# Patient Record
Sex: FEMALE | Race: WHITE | NOT HISPANIC OR LATINO | Employment: FULL TIME | URBAN - METROPOLITAN AREA
[De-identification: names, ages, dates, MRNs, and addresses within clinical notes are randomized per-mention and may not be internally consistent; named-entity substitution may affect disease eponyms.]

---

## 2017-02-22 ENCOUNTER — OFFICE VISIT (OUTPATIENT)
Dept: OPHTHALMOLOGY | Facility: CLINIC | Age: 22
End: 2017-02-22
Payer: COMMERCIAL

## 2017-02-22 DIAGNOSIS — H16.142: Primary | ICD-10-CM

## 2017-02-22 DIAGNOSIS — T15.92XA FOREIGN BODY, EYE, LEFT, INITIAL ENCOUNTER: ICD-10-CM

## 2017-02-22 PROCEDURE — 92002 INTRM OPH EXAM NEW PATIENT: CPT | Mod: 25,S$GLB,, | Performed by: OPTOMETRIST

## 2017-02-22 PROCEDURE — 65205 REMOVE FOREIGN BODY FROM EYE: CPT | Mod: LT,S$GLB,, | Performed by: OPTOMETRIST

## 2017-02-22 PROCEDURE — 99999 PR PBB SHADOW E&M-NEW PATIENT-LVL II: CPT | Mod: PBBFAC,,, | Performed by: OPTOMETRIST

## 2017-02-22 NOTE — PROGRESS NOTES
HPI     New Patient  Chief complaint: irritation, OS  Onset: last night  Patient has rinsed eye with cold water and has gotten temporary relief  Patient states that left eye is burning and running water  No matting in the AM  Light sensitive   No blurred vision  Patient is a contact lens wear and did not have lenses in at time of   occurrence   Foreign body sensation         Last edited by Aldo Walls MA on 2/22/2017  9:59 AM.         Assessment /Plan     For exam results, see Encounter Report.    Keratitis, superficial, punctate, left    Foreign body, eye, left, initial encounter      Mild keratitis vs. FB track superior cornea OS.  Everted upper left lid and found a fiber/small lash removed with sterile swab without issues.  She irrigated her OS copiously last night and her eye feels better this morning.  I gave her Tobrex solution to use 4 times a day for 3 days.  No CL wear for 3 days.  She has no SEI or A/C changes.  RTC prn.

## 2017-02-22 NOTE — MR AVS SNAPSHOT
[facility]hthalmology  [address] [facility] [address] [place] [address]  Phone: [phone]  Fax: [phone]                  Isa Clark   2017 9:45 AM   Office Visit    Description:  Female : 1995   Provider:  ROBERT Rizzo OD   Department:  [facility]           Reason for Visit     Eye Problem           Diagnoses this Visit        Comments    Keratitis, superficial, punctate, left    -  Primary     Foreign body, eye, left, initial encounter                To Do List           Goals (5 Years of Data)     None      Follow-Up and Disposition     Return if symptoms worsen or fail to improve.      [facility]     [facility] Nurse Care Line -  Assistance  Registered nurses in the [facility] provide clinical advisement, health education, appointment booking, and other advisory services.  Call for this free service at [phone].             Medications           Message regarding Medications     Verify the changes and/or additions to your medication regime listed below are the same as discussed with your clinician today.  If any of these changes or additions are incorrect, please notify your healthcare provider.             Verify that the below list of medications is an accurate representation of the medications you are currently taking.  If none reported, the list may be blank. If incorrect, please contact your healthcare provider. Carry this list with you in case of emergency.                Clinical Reference Information           Allergies as of 2017     No Known Allergies      Immunizations Administered on Date of Encounter - 2017     None      [facility] Sign-Up     Activating your [facility] account is as easy as 1-2-3!     1) Visit [email or web address], select Sign Up Now, enter this activation code and your date of birth, then select Next.  [email or web address]  Expires: 2017  8:29 AM      2) Create a username and password to use when you visit  [facility] in the future and select a security question in case you lose your password and select Next.    3) Enter your e-mail address and click Sign Up!    Additional Information  If you have questions, please e-mail [email or web address] or call [phone] to talk to our [facility] staff. Remember, [facility] is NOT to be used for urgent needs. For medical emergencies, dial 911.         Language Assistance Services     ATTENTION: Language assistance services are available, free of charge. Please call [phone].      ATENCIÓN: Si habla español, tiene a antonio disposición servicios gratuitos de asistencia lingüística. Llame al [phone].     CHÚ Ý: N?u b?n nói Ti?ng Vi?t, có các d?ch v? h? tr? ngôn ng? mi?n phí dành cho b?n. G?i s? [phone].         [facility] complies with applicable Federal civil rights laws and does not discriminate on the basis of race, color, national origin, age, disability, or sex.

## 2021-07-25 ENCOUNTER — CLINICAL SUPPORT (OUTPATIENT)
Dept: URGENT CARE | Facility: CLINIC | Age: 26
End: 2021-07-25
Payer: COMMERCIAL

## 2021-07-25 DIAGNOSIS — Z20.822 COVID-19 RULED OUT: Primary | ICD-10-CM

## 2021-07-25 LAB
CTP QC/QA: YES
SARS-COV-2 RDRP RESP QL NAA+PROBE: NEGATIVE

## 2021-07-25 PROCEDURE — U0002: ICD-10-PCS | Mod: QW,S$GLB,, | Performed by: PHYSICIAN ASSISTANT

## 2021-07-25 PROCEDURE — U0002 COVID-19 LAB TEST NON-CDC: HCPCS | Mod: QW,S$GLB,, | Performed by: PHYSICIAN ASSISTANT

## 2023-04-06 LAB
ABO AND RH: NORMAL
C TRACH RRNA SPEC QL PROBE: NEGATIVE
GONORRHEA: NEGATIVE
HBV SURFACE AG SERPL QL IA: NEGATIVE
HIV 1+2 AB+HIV1 P24 AG SERPL QL IA: NEGATIVE
RUBV IGG SER QL: NORMAL
T PALLIDUM AB SER QL: NONREACTIVE

## 2023-09-12 ENCOUNTER — HOSPITAL ENCOUNTER (EMERGENCY)
Facility: HOSPITAL | Age: 28
Discharge: HOME | End: 2023-09-12
Attending: SPECIALIST
Payer: COMMERCIAL

## 2023-09-12 VITALS
OXYGEN SATURATION: 99 % | RESPIRATION RATE: 18 BRPM | HEIGHT: 62 IN | WEIGHT: 146 LBS | TEMPERATURE: 98 F | DIASTOLIC BLOOD PRESSURE: 55 MMHG | SYSTOLIC BLOOD PRESSURE: 111 MMHG | HEART RATE: 87 BPM | BODY MASS INDEX: 26.87 KG/M2

## 2023-09-12 PROCEDURE — 96360 HYDRATION IV INFUSION INIT: CPT

## 2023-09-12 PROCEDURE — 63600175 PHARM REV CODE 636 W HCPCS: Performed by: SPECIALIST

## 2023-09-12 PROCEDURE — 99284 EMERGENCY DEPT VISIT MOD MDM: CPT | Mod: 25

## 2023-09-12 RX ORDER — ONDANSETRON 2 MG/ML
4 INJECTION INTRAMUSCULAR; INTRAVENOUS
Status: DISCONTINUED | OUTPATIENT
Start: 2023-09-12 | End: 2023-09-12 | Stop reason: HOSPADM

## 2023-09-12 RX ORDER — DEXTROSE, SODIUM CHLORIDE, SODIUM LACTATE, POTASSIUM CHLORIDE, AND CALCIUM CHLORIDE 5; .6; .31; .03; .02 G/100ML; G/100ML; G/100ML; G/100ML; G/100ML
INJECTION, SOLUTION INTRAVENOUS
Status: COMPLETED | OUTPATIENT
Start: 2023-09-12 | End: 2023-09-12

## 2023-09-12 RX ADMIN — SODIUM CHLORIDE, SODIUM LACTATE, POTASSIUM CHLORIDE, CALCIUM CHLORIDE AND DEXTROSE MONOHYDRATE: 5; 600; 310; 30; 20 INJECTION, SOLUTION INTRAVENOUS at 05:09
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
## 2023-09-12 NOTE — ED TRIAGE NOTES
Zofran offered per md order. Pt declines at this time stating it makes her sleepy. Patient sitting up talking and laughing with friend at bs; denies any pain

## 2023-09-21 LAB
PRENATAL STREP B CULTURE: NEGATIVE
STREP B PCR (OHS): NOT DETECTED

## 2023-10-12 ENCOUNTER — HOSPITAL ENCOUNTER (INPATIENT)
Facility: HOSPITAL | Age: 28
LOS: 2 days | Discharge: HOME | End: 2023-10-14
Attending: OBSTETRICS & GYNECOLOGY | Admitting: OBSTETRICS & GYNECOLOGY
Payer: COMMERCIAL

## 2023-10-12 ENCOUNTER — ANESTHESIA (OUTPATIENT)
Dept: OBSTETRICS AND GYNECOLOGY | Facility: HOSPITAL | Age: 28
End: 2023-10-12
Payer: COMMERCIAL

## 2023-10-12 ENCOUNTER — ANESTHESIA EVENT (OUTPATIENT)
Dept: OBSTETRICS AND GYNECOLOGY | Facility: HOSPITAL | Age: 28
End: 2023-10-12
Payer: COMMERCIAL

## 2023-10-12 VITALS — RESPIRATION RATE: 16 BRPM

## 2023-10-12 DIAGNOSIS — Z34.90 ENCOUNTER FOR INDUCTION OF LABOR: Primary | ICD-10-CM

## 2023-10-12 LAB
ABORH RETYPE: NORMAL
BASOPHILS # BLD AUTO: 0.02 X10(3)/MCL
BASOPHILS NFR BLD AUTO: 0.3 %
EOSINOPHIL # BLD AUTO: 0.04 X10(3)/MCL (ref 0–0.9)
EOSINOPHIL NFR BLD AUTO: 0.6 %
ERYTHROCYTE [DISTWIDTH] IN BLOOD BY AUTOMATED COUNT: 13.7 % (ref 11.5–17)
GROUP & RH: NORMAL
HCT VFR BLD AUTO: 36.9 % (ref 37–47)
HGB BLD-MCNC: 12.6 G/DL (ref 12–16)
IMM GRANULOCYTES # BLD AUTO: 0.03 X10(3)/MCL (ref 0–0.04)
IMM GRANULOCYTES NFR BLD AUTO: 0.4 %
INDIRECT COOMBS GEL: NORMAL
LYMPHOCYTES # BLD AUTO: 1.34 X10(3)/MCL (ref 0.6–4.6)
LYMPHOCYTES NFR BLD AUTO: 20 %
MCH RBC QN AUTO: 31 PG (ref 27–31)
MCHC RBC AUTO-ENTMCNC: 34.1 G/DL (ref 33–36)
MCV RBC AUTO: 90.7 FL (ref 80–94)
MONOCYTES # BLD AUTO: 0.67 X10(3)/MCL (ref 0.1–1.3)
MONOCYTES NFR BLD AUTO: 10 %
NEUTROPHILS # BLD AUTO: 4.6 X10(3)/MCL (ref 2.1–9.2)
NEUTROPHILS NFR BLD AUTO: 68.7 %
NRBC BLD AUTO-RTO: 0 %
PLATELET # BLD AUTO: 174 X10(3)/MCL (ref 130–400)
PMV BLD AUTO: 11.9 FL (ref 7.4–10.4)
RBC # BLD AUTO: 4.07 X10(6)/MCL (ref 4.2–5.4)
SPECIMEN OUTDATE: NORMAL
T PALLIDUM AB SER QL: NONREACTIVE
WBC # SPEC AUTO: 6.7 X10(3)/MCL (ref 4.5–11.5)

## 2023-10-12 PROCEDURE — 59409 OBSTETRICAL CARE: CPT | Mod: AA,P2,, | Performed by: ANESTHESIOLOGY

## 2023-10-12 PROCEDURE — 25000003 PHARM REV CODE 250: Performed by: OBSTETRICS & GYNECOLOGY

## 2023-10-12 PROCEDURE — 72200005 HC VAGINAL DELIVERY LEVEL II

## 2023-10-12 PROCEDURE — 86900 BLOOD TYPING SEROLOGIC ABO: CPT | Performed by: OBSTETRICS & GYNECOLOGY

## 2023-10-12 PROCEDURE — 72100003 HC LABOR CARE, EA. ADDL. 8 HRS

## 2023-10-12 PROCEDURE — 62326 NJX INTERLAMINAR LMBR/SAC: CPT | Performed by: ANESTHESIOLOGY

## 2023-10-12 PROCEDURE — 63600175 PHARM REV CODE 636 W HCPCS: Performed by: OBSTETRICS & GYNECOLOGY

## 2023-10-12 PROCEDURE — 11000001 HC ACUTE MED/SURG PRIVATE ROOM

## 2023-10-12 PROCEDURE — 59409 PRA ETRICAL CARE,VAG DELIV ONLY: ICD-10-PCS | Mod: AA,P2,, | Performed by: ANESTHESIOLOGY

## 2023-10-12 PROCEDURE — 25000003 PHARM REV CODE 250: Performed by: ANESTHESIOLOGY

## 2023-10-12 PROCEDURE — 63600175 PHARM REV CODE 636 W HCPCS: Performed by: ANESTHESIOLOGY

## 2023-10-12 PROCEDURE — 51702 INSERT TEMP BLADDER CATH: CPT

## 2023-10-12 PROCEDURE — 72100002 HC LABOR CARE, 1ST 8 HOURS

## 2023-10-12 PROCEDURE — 86780 TREPONEMA PALLIDUM: CPT | Performed by: OBSTETRICS & GYNECOLOGY

## 2023-10-12 PROCEDURE — 85025 COMPLETE CBC W/AUTO DIFF WBC: CPT | Performed by: OBSTETRICS & GYNECOLOGY

## 2023-10-12 RX ORDER — OXYTOCIN/RINGER'S LACTATE 30/500 ML
95 PLASTIC BAG, INJECTION (ML) INTRAVENOUS ONCE
Status: DISCONTINUED | OUTPATIENT
Start: 2023-10-12 | End: 2023-10-14 | Stop reason: HOSPADM

## 2023-10-12 RX ORDER — ONDANSETRON 2 MG/ML
4 INJECTION INTRAMUSCULAR; INTRAVENOUS EVERY 6 HOURS PRN
Status: DISCONTINUED | OUTPATIENT
Start: 2023-10-12 | End: 2023-10-12 | Stop reason: SDUPTHER

## 2023-10-12 RX ORDER — SIMETHICONE 80 MG
1 TABLET,CHEWABLE ORAL EVERY 6 HOURS PRN
Status: DISCONTINUED | OUTPATIENT
Start: 2023-10-12 | End: 2023-10-14 | Stop reason: HOSPADM

## 2023-10-12 RX ORDER — CALCIUM CARBONATE 200(500)MG
500 TABLET,CHEWABLE ORAL 3 TIMES DAILY PRN
Status: DISCONTINUED | OUTPATIENT
Start: 2023-10-12 | End: 2023-10-14 | Stop reason: HOSPADM

## 2023-10-12 RX ORDER — DIPHENHYDRAMINE HYDROCHLORIDE 50 MG/ML
25 INJECTION INTRAMUSCULAR; INTRAVENOUS EVERY 4 HOURS PRN
Status: DISCONTINUED | OUTPATIENT
Start: 2023-10-12 | End: 2023-10-14 | Stop reason: HOSPADM

## 2023-10-12 RX ORDER — OXYTOCIN/RINGER'S LACTATE 30/500 ML
334 PLASTIC BAG, INJECTION (ML) INTRAVENOUS ONCE AS NEEDED
Status: DISCONTINUED | OUTPATIENT
Start: 2023-10-12 | End: 2023-10-14 | Stop reason: HOSPADM

## 2023-10-12 RX ORDER — DOCUSATE SODIUM 100 MG/1
200 CAPSULE, LIQUID FILLED ORAL 2 TIMES DAILY PRN
Status: DISCONTINUED | OUTPATIENT
Start: 2023-10-12 | End: 2023-10-14 | Stop reason: HOSPADM

## 2023-10-12 RX ORDER — OXYTOCIN/RINGER'S LACTATE 30/500 ML
0-30 PLASTIC BAG, INJECTION (ML) INTRAVENOUS CONTINUOUS
Status: DISCONTINUED | OUTPATIENT
Start: 2023-10-12 | End: 2023-10-14 | Stop reason: HOSPADM

## 2023-10-12 RX ORDER — OXYTOCIN/RINGER'S LACTATE 30/500 ML
95 PLASTIC BAG, INJECTION (ML) INTRAVENOUS ONCE AS NEEDED
Status: DISCONTINUED | OUTPATIENT
Start: 2023-10-12 | End: 2023-10-14 | Stop reason: HOSPADM

## 2023-10-12 RX ORDER — MISOPROSTOL 100 UG/1
800 TABLET ORAL ONCE AS NEEDED
Status: DISCONTINUED | OUTPATIENT
Start: 2023-10-12 | End: 2023-10-14 | Stop reason: HOSPADM

## 2023-10-12 RX ORDER — LIDOCAINE HYDROCHLORIDE 10 MG/ML
1 INJECTION, SOLUTION EPIDURAL; INFILTRATION; INTRACAUDAL; PERINEURAL ONCE
Status: DISCONTINUED | OUTPATIENT
Start: 2023-10-12 | End: 2023-10-14 | Stop reason: HOSPADM

## 2023-10-12 RX ORDER — DEXTROSE, SODIUM CHLORIDE, SODIUM LACTATE, POTASSIUM CHLORIDE, AND CALCIUM CHLORIDE 5; .6; .31; .03; .02 G/100ML; G/100ML; G/100ML; G/100ML; G/100ML
INJECTION, SOLUTION INTRAVENOUS CONTINUOUS
Status: DISCONTINUED | OUTPATIENT
Start: 2023-10-12 | End: 2023-10-14 | Stop reason: HOSPADM

## 2023-10-12 RX ORDER — DIPHENOXYLATE HYDROCHLORIDE AND ATROPINE SULFATE 2.5; .025 MG/1; MG/1
2 TABLET ORAL EVERY 6 HOURS PRN
Status: DISCONTINUED | OUTPATIENT
Start: 2023-10-12 | End: 2023-10-14 | Stop reason: HOSPADM

## 2023-10-12 RX ORDER — NALOXONE HCL 0.4 MG/ML
0.4 VIAL (ML) INJECTION SEE ADMIN INSTRUCTIONS
Status: DISCONTINUED | OUTPATIENT
Start: 2023-10-12 | End: 2023-10-14 | Stop reason: HOSPADM

## 2023-10-12 RX ORDER — PROCHLORPERAZINE EDISYLATE 5 MG/ML
5 INJECTION INTRAMUSCULAR; INTRAVENOUS EVERY 6 HOURS PRN
Status: DISCONTINUED | OUTPATIENT
Start: 2023-10-12 | End: 2023-10-14 | Stop reason: HOSPADM

## 2023-10-12 RX ORDER — OXYTOCIN/RINGER'S LACTATE 30/500 ML
334 PLASTIC BAG, INJECTION (ML) INTRAVENOUS ONCE AS NEEDED
Status: DISCONTINUED | OUTPATIENT
Start: 2023-10-12 | End: 2023-10-12 | Stop reason: SDUPTHER

## 2023-10-12 RX ORDER — BUPIVACAINE HYDROCHLORIDE 2.5 MG/ML
INJECTION, SOLUTION EPIDURAL; INFILTRATION; INTRACAUDAL
Status: COMPLETED | OUTPATIENT
Start: 2023-10-12 | End: 2023-10-12

## 2023-10-12 RX ORDER — HYDROCORTISONE 25 MG/G
CREAM TOPICAL 3 TIMES DAILY PRN
Status: DISCONTINUED | OUTPATIENT
Start: 2023-10-12 | End: 2023-10-14 | Stop reason: HOSPADM

## 2023-10-12 RX ORDER — EPHEDRINE SULFATE 50 MG/ML
10 INJECTION, SOLUTION INTRAVENOUS ONCE AS NEEDED
Status: DISCONTINUED | OUTPATIENT
Start: 2023-10-12 | End: 2023-10-14 | Stop reason: HOSPADM

## 2023-10-12 RX ORDER — PROCHLORPERAZINE EDISYLATE 5 MG/ML
5 INJECTION INTRAMUSCULAR; INTRAVENOUS EVERY 6 HOURS PRN
Status: DISCONTINUED | OUTPATIENT
Start: 2023-10-12 | End: 2023-10-12 | Stop reason: SDUPTHER

## 2023-10-12 RX ORDER — ACETAMINOPHEN 325 MG/1
650 TABLET ORAL EVERY 6 HOURS PRN
Status: DISCONTINUED | OUTPATIENT
Start: 2023-10-12 | End: 2023-10-14 | Stop reason: HOSPADM

## 2023-10-12 RX ORDER — PRENATAL WITH FERROUS FUM AND FOLIC ACID 3080; 920; 120; 400; 22; 1.84; 3; 20; 10; 1; 12; 200; 27; 25; 2 [IU]/1; [IU]/1; MG/1; [IU]/1; MG/1; MG/1; MG/1; MG/1; MG/1; MG/1; UG/1; MG/1; MG/1; MG/1; MG/1
1 TABLET ORAL DAILY
Status: DISCONTINUED | OUTPATIENT
Start: 2023-10-13 | End: 2023-10-14 | Stop reason: HOSPADM

## 2023-10-12 RX ORDER — LIDOCAINE HYDROCHLORIDE 10 MG/ML
10 INJECTION INFILTRATION; PERINEURAL ONCE AS NEEDED
Status: DISCONTINUED | OUTPATIENT
Start: 2023-10-12 | End: 2023-10-14 | Stop reason: HOSPADM

## 2023-10-12 RX ORDER — DIPHENOXYLATE HYDROCHLORIDE AND ATROPINE SULFATE 2.5; .025 MG/1; MG/1
2 TABLET ORAL EVERY 6 HOURS PRN
Status: DISCONTINUED | OUTPATIENT
Start: 2023-10-12 | End: 2023-10-12 | Stop reason: SDUPTHER

## 2023-10-12 RX ORDER — ONDANSETRON 4 MG/1
8 TABLET, ORALLY DISINTEGRATING ORAL EVERY 8 HOURS PRN
Status: DISCONTINUED | OUTPATIENT
Start: 2023-10-12 | End: 2023-10-14 | Stop reason: HOSPADM

## 2023-10-12 RX ORDER — OXYCODONE AND ACETAMINOPHEN 5; 325 MG/1; MG/1
1 TABLET ORAL EVERY 4 HOURS PRN
Status: DISCONTINUED | OUTPATIENT
Start: 2023-10-12 | End: 2023-10-14 | Stop reason: HOSPADM

## 2023-10-12 RX ORDER — MISOPROSTOL 100 UG/1
800 TABLET ORAL ONCE AS NEEDED
Status: DISCONTINUED | OUTPATIENT
Start: 2023-10-12 | End: 2023-10-12 | Stop reason: SDUPTHER

## 2023-10-12 RX ORDER — IBUPROFEN 600 MG/1
600 TABLET ORAL EVERY 6 HOURS PRN
Status: DISCONTINUED | OUTPATIENT
Start: 2023-10-12 | End: 2023-10-14 | Stop reason: HOSPADM

## 2023-10-12 RX ORDER — CARBOPROST TROMETHAMINE 250 UG/ML
250 INJECTION, SOLUTION INTRAMUSCULAR
Status: DISCONTINUED | OUTPATIENT
Start: 2023-10-12 | End: 2023-10-14 | Stop reason: HOSPADM

## 2023-10-12 RX ORDER — FENTANYL/BUPIVACAINE/NS/PF 2-1250MCG
PLASTIC BAG, INJECTION (ML) INJECTION CONTINUOUS PRN
Status: DISCONTINUED | OUTPATIENT
Start: 2023-10-12 | End: 2023-10-12

## 2023-10-12 RX ORDER — MUPIROCIN 20 MG/G
OINTMENT TOPICAL
Status: DISCONTINUED | OUTPATIENT
Start: 2023-10-12 | End: 2023-10-14 | Stop reason: HOSPADM

## 2023-10-12 RX ORDER — OXYTOCIN 10 [USP'U]/ML
10 INJECTION, SOLUTION INTRAMUSCULAR; INTRAVENOUS ONCE AS NEEDED
Status: DISCONTINUED | OUTPATIENT
Start: 2023-10-12 | End: 2023-10-14 | Stop reason: HOSPADM

## 2023-10-12 RX ORDER — OXYTOCIN/RINGER'S LACTATE 30/500 ML
334 PLASTIC BAG, INJECTION (ML) INTRAVENOUS ONCE
Status: DISCONTINUED | OUTPATIENT
Start: 2023-10-12 | End: 2023-10-14 | Stop reason: HOSPADM

## 2023-10-12 RX ORDER — ACETAMINOPHEN 325 MG/1
650 TABLET ORAL EVERY 6 HOURS PRN
Status: DISCONTINUED | OUTPATIENT
Start: 2023-10-12 | End: 2023-10-12 | Stop reason: SDUPTHER

## 2023-10-12 RX ORDER — BUPIVACAINE HYDROCHLORIDE 2.5 MG/ML
INJECTION, SOLUTION INFILTRATION; PERINEURAL
Status: DISCONTINUED | OUTPATIENT
Start: 2023-10-12 | End: 2023-10-12

## 2023-10-12 RX ORDER — METHYLERGONOVINE MALEATE 0.2 MG/ML
200 INJECTION INTRAVENOUS ONCE AS NEEDED
Status: DISCONTINUED | OUTPATIENT
Start: 2023-10-12 | End: 2023-10-12 | Stop reason: SDUPTHER

## 2023-10-12 RX ORDER — OXYCODONE AND ACETAMINOPHEN 10; 325 MG/1; MG/1
1 TABLET ORAL EVERY 4 HOURS PRN
Status: DISCONTINUED | OUTPATIENT
Start: 2023-10-12 | End: 2023-10-14 | Stop reason: HOSPADM

## 2023-10-12 RX ORDER — CARBOPROST TROMETHAMINE 250 UG/ML
250 INJECTION, SOLUTION INTRAMUSCULAR
Status: DISCONTINUED | OUTPATIENT
Start: 2023-10-12 | End: 2023-10-12 | Stop reason: SDUPTHER

## 2023-10-12 RX ORDER — OXYTOCIN/RINGER'S LACTATE 30/500 ML
95 PLASTIC BAG, INJECTION (ML) INTRAVENOUS ONCE AS NEEDED
Status: DISCONTINUED | OUTPATIENT
Start: 2023-10-12 | End: 2023-10-12 | Stop reason: SDUPTHER

## 2023-10-12 RX ORDER — SIMETHICONE 80 MG
1 TABLET,CHEWABLE ORAL 4 TIMES DAILY PRN
Status: DISCONTINUED | OUTPATIENT
Start: 2023-10-12 | End: 2023-10-12 | Stop reason: SDUPTHER

## 2023-10-12 RX ORDER — DIPHENHYDRAMINE HCL 25 MG
25 CAPSULE ORAL EVERY 4 HOURS PRN
Status: DISCONTINUED | OUTPATIENT
Start: 2023-10-12 | End: 2023-10-14 | Stop reason: HOSPADM

## 2023-10-12 RX ORDER — OXYTOCIN 10 [USP'U]/ML
10 INJECTION, SOLUTION INTRAMUSCULAR; INTRAVENOUS ONCE AS NEEDED
Status: DISCONTINUED | OUTPATIENT
Start: 2023-10-12 | End: 2023-10-12 | Stop reason: SDUPTHER

## 2023-10-12 RX ORDER — SODIUM CITRATE AND CITRIC ACID MONOHYDRATE 334; 500 MG/5ML; MG/5ML
30 SOLUTION ORAL ONCE
Status: DISCONTINUED | OUTPATIENT
Start: 2023-10-12 | End: 2023-10-14 | Stop reason: HOSPADM

## 2023-10-12 RX ORDER — METHYLERGONOVINE MALEATE 0.2 MG/ML
200 INJECTION INTRAVENOUS ONCE AS NEEDED
Status: DISCONTINUED | OUTPATIENT
Start: 2023-10-12 | End: 2023-10-14 | Stop reason: HOSPADM

## 2023-10-12 RX ADMIN — BUPIVACAINE HYDROCHLORIDE 10 ML: 2.5 INJECTION, SOLUTION INFILTRATION; PERINEURAL at 02:10

## 2023-10-12 RX ADMIN — BUPIVACAINE HYDROCHLORIDE 7 ML: 2.5 INJECTION, SOLUTION EPIDURAL; INFILTRATION; INTRACAUDAL; PERINEURAL at 11:10

## 2023-10-12 RX ADMIN — SODIUM CHLORIDE, SODIUM LACTATE, POTASSIUM CHLORIDE, CALCIUM CHLORIDE AND DEXTROSE MONOHYDRATE: 5; 600; 310; 30; 20 INJECTION, SOLUTION INTRAVENOUS at 05:10

## 2023-10-12 RX ADMIN — SODIUM CHLORIDE, POTASSIUM CHLORIDE, SODIUM LACTATE AND CALCIUM CHLORIDE 1000 ML: 600; 310; 30; 20 INJECTION, SOLUTION INTRAVENOUS at 10:10

## 2023-10-12 RX ADMIN — PROCHLORPERAZINE EDISYLATE 5 MG: 5 INJECTION INTRAMUSCULAR; INTRAVENOUS at 03:10

## 2023-10-12 RX ADMIN — Medication 2 MILLI-UNITS/MIN: at 05:10

## 2023-10-12 RX ADMIN — SODIUM CHLORIDE, SODIUM LACTATE, POTASSIUM CHLORIDE, CALCIUM CHLORIDE AND DEXTROSE MONOHYDRATE: 5; 600; 310; 30; 20 INJECTION, SOLUTION INTRAVENOUS at 04:10

## 2023-10-12 RX ADMIN — IBUPROFEN 600 MG: 600 TABLET, FILM COATED ORAL at 11:10

## 2023-10-12 RX ADMIN — ONDANSETRON 4 MG: 2 INJECTION INTRAMUSCULAR; INTRAVENOUS at 11:10

## 2023-10-12 RX ADMIN — Medication 12 ML/HR: at 11:10

## 2023-10-12 NOTE — H&P
"   HISTORY AND PHYSICAL                                                OBSTETRICS          Subjective:      Isa Rice is a 28 y.o.  female with IUP at 39w0d weeks gestation who presents to L&D for IOL. Pertinent medical history for this pregnancy includes none.  Care this pregnancy has been with Dr. Brooke SEWELL    PMHx:   Past Medical History:   Diagnosis Date    Glossopharyngeal neuralgia        PSHx:   Past Surgical History:   Procedure Laterality Date    TONSILLECTOMY AND ADENOIDECTOMY      WISDOM TOOTH EXTRACTION         All: Review of patient's allergies indicates:  No Known Allergies    Meds:   Medications Prior to Admission   Medication Sig Dispense Refill Last Dose    prenatal vit/iron fum/folic ac (PRENATAL 1+1 ORAL) Take by mouth.   10/12/2023       SH:   Social History     Socioeconomic History    Marital status:    Tobacco Use    Smoking status: Never    Smokeless tobacco: Never   Substance and Sexual Activity    Alcohol use: Yes    Drug use: Never    Sexual activity: Yes     Partners: Male     Birth control/protection: None       FH:   Family History   Problem Relation Age of Onset    Glaucoma Maternal Grandmother     Diabetes Maternal Grandmother     Glaucoma Maternal Grandfather     Diabetes Maternal Grandfather        OBHx:   OB History    Para Term  AB Living   1 0 0 0 0 0   SAB IAB Ectopic Multiple Live Births   0 0 0 0 0      # Outcome Date GA Lbr Solo/2nd Weight Sex Delivery Anes PTL Lv   1 Current                Objective:      /68   Pulse 64   Temp 98.1 °F (36.7 °C)   Resp 17   Ht 5' 2" (1.575 m)   Wt 67.6 kg (149 lb)   SpO2 99%   Breastfeeding No   BMI 27.25 kg/m²   Body mass index is 27.25 kg/m².    General:   alert and cooperative   HEENT:  normocephalic, atraumatic   Lungs:   clear to auscultation bilaterally   Heart:   regular rate and rhythm, S1, S2 normal   Abdomen:  gravid, non-tender   Extremities non-tender, no edema   Derm: no rashes " or lesions   Psych: appropriate mood and affect   Pelvis:  adequate       FHT: Category: 1                 TOCO: Contractions: regular, every 3 minutes       Cervix:     Dilation: 2 cm    Effacement: 75%    Station:  -2    Consistency: moderate    Position anterior   S/p AROM with clear fluid    Lab Review  GBS: negative      Assessment:     28 y.o.  at 39w0d weeks gestation for IOL     Plan:     1. Risks, benefits, alternatives and possible complications have been discussed in detail with the patient. All questions have been answered, and Ms. Rice has voiced understanding and agrees to the treatment plan.  2. Consents signed and in chart  3. Admit to Labor and Delivery unit  4. S/p AROM.  Cont Pitocin.  Suspected LGA on u/s.  Will monitor labor progess once active.  All questions answered.

## 2023-10-12 NOTE — ANESTHESIA PREPROCEDURE EVALUATION
"                                                                                                             10/12/2023  Isa Rice is a 28 y.o., female w/ IUP @2cm 8/10 pain.    Last 3 sets of Vitals        9/12/2023     4:18 PM 10/12/2023     3:00 AM 10/12/2023     5:11 AM   Vitals - 1 value per visit   SYSTOLIC   120   DIASTOLIC   68   Pulse   85   Temp 36.4 °C (97.6 °F)  36.7 °C (98.1 °F)   Resp 18  17   SPO2   100 %   Weight (lb) 146 149    Weight (kg) 66.225 67.586    Height 5' 2" (1.575 m) 5' 2" (1.575 m)    BMI (Calculated) 26.7 27.2          Lab Results   Component Value Date    WBC 6.70 10/12/2023    HGB 12.6 10/12/2023    HCT 36.9 (L) 10/12/2023    MCV 90.7 10/12/2023     10/12/2023      Pre-op Assessment    I have reviewed the Patient Summary Reports.     I have reviewed the Nursing Notes. I have reviewed the NPO Status.   I have reviewed the Medications.     Review of Systems  Anesthesia Hx:  No problems with previous Anesthesia  History of prior surgery of interest to airway management or planning:   Cardiovascular:  Cardiovascular Normal   Functional Capacity good / => 4 METS    Pulmonary:  Pulmonary Normal    OB/GYN/PEDS:  Planned Vaginal Delivery    Neurological:  Neurology Normal        Physical Exam  General: Well nourished, Cooperative, Alert and Oriented    Airway:  Mallampati: II   Mouth Opening: Normal  TM Distance: Normal  Tongue: Normal  Neck ROM: Normal ROM    Dental:  Intact    Chest/Lungs:  Clear to auscultation, Normal Respiratory Rate    Heart:  Rate: Normal  Rhythm: Regular Rhythm        Anesthesia Plan  Type of Anesthesia, risks & benefits discussed:    Anesthesia Type: Epidural  Post Op Pain Control Plan: epidural analgesia  Informed Consent: Informed consent signed with the Patient and all parties understand the risks and agree with anesthesia plan.  All questions answered.   ASA Score: 2  Day of Surgery Review of History & Physical: H&P Update referred to the " surgeon/provider.    Ready For Surgery From Anesthesia Perspective.     .

## 2023-10-12 NOTE — ANESTHESIA PROCEDURE NOTES
Epidural    Patient location during procedure: OB   Reason for block: primary anesthetic   Reason for block: labor analgesia requested by patient and obstetrician  Diagnosis: IUP in labor   Start time: 10/12/2023 11:05 AM  Timeout: 10/12/2023 11:03 AM  End time: 10/12/2023 11:13 AM    Staffing  Performing Provider: Guillermo Syed Jr., MD  Authorizing Provider: Guillermo Syed Jr., MD    Staffing  Performed by: Guillermo Syed Jr., MD  Authorized by: Guillermo Syed Jr., MD        Preanesthetic Checklist  Completed: patient identified, IV checked, site marked, risks and benefits discussed, surgical consent, monitors and equipment checked, pre-op evaluation, timeout performed, anesthesia consent given, hand hygiene performed and patient being monitored  Preparation  Patient position: sitting  Prep: ChloraPrep  Patient monitoring: Pulse Ox and Blood Pressure  Reason for block: primary anesthetic   Epidural  Skin Anesthetic: lidocaine 1%  Skin Wheal: 4 mL  Administration type: continuous  Approach: midline  Interspace: L4-5    Injection technique: ALISSA air and ALISSA saline  Needle and Epidural Catheter  Needle type: Tuohy   Needle gauge: 17  Needle length: 3.5 inches  Catheter type: [employer]  Catheter size: 19 G  Insertion Attempts: 1  Test dose: 3 mL of lidocaine 1.5% with Epi 1-to-200,000  Additional Documentation: incremental injection, no paresthesia on injection, no significant pain on injection, negative aspiration for heme and CSF, no signs/symptoms of IV or SA injection and no significant complaints from patient  Needle localization: anatomical landmarks  Assessment  Upper dermatomal levels - Left: T8  Right: T8   Dermatomal levels determined by ice  Ease of block: easy  Patient's tolerance of the procedure: comfortable throughout block and no complaints  Additional Notes  Postdural puncture w/ 5in 25g Mariano    Initial Epidrual bolus diluted w/ 7cc PFNS No inadvertent dural puncture with Tuohy.  Dural  puncture performed with spinal needle.      Medications:    Medications: bupivacaine (pf) (MARCAINE) injection 0.25% - Epidural   7 mL - 10/12/2023 11:14:00 AM

## 2023-10-13 PROCEDURE — 63600175 PHARM REV CODE 636 W HCPCS: Performed by: OBSTETRICS & GYNECOLOGY

## 2023-10-13 PROCEDURE — 90471 IMMUNIZATION ADMIN: CPT | Performed by: OBSTETRICS & GYNECOLOGY

## 2023-10-13 PROCEDURE — 25000003 PHARM REV CODE 250: Performed by: OBSTETRICS & GYNECOLOGY

## 2023-10-13 PROCEDURE — 11000001 HC ACUTE MED/SURG PRIVATE ROOM

## 2023-10-13 PROCEDURE — 90686 IIV4 VACC NO PRSV 0.5 ML IM: CPT | Performed by: OBSTETRICS & GYNECOLOGY

## 2023-10-13 RX ADMIN — IBUPROFEN 600 MG: 600 TABLET, FILM COATED ORAL at 07:10

## 2023-10-13 RX ADMIN — PRENATAL VITAMINS-IRON FUMARATE 27 MG IRON-FOLIC ACID 0.8 MG TABLET 1 TABLET: at 10:10

## 2023-10-13 RX ADMIN — IBUPROFEN 600 MG: 600 TABLET, FILM COATED ORAL at 06:10

## 2023-10-13 RX ADMIN — Medication: at 04:10

## 2023-10-13 RX ADMIN — IBUPROFEN 600 MG: 600 TABLET, FILM COATED ORAL at 01:10

## 2023-10-13 RX ADMIN — ACETAMINOPHEN 650 MG: 325 TABLET, FILM COATED ORAL at 04:10

## 2023-10-13 RX ADMIN — ACETAMINOPHEN 650 MG: 325 TABLET, FILM COATED ORAL at 10:10

## 2023-10-13 RX ADMIN — INFLUENZA VIRUS VACCINE 0.5 ML: 15; 15; 15; 15 SUSPENSION INTRAMUSCULAR at 12:10

## 2023-10-13 NOTE — PROGRESS NOTES
PostPartum Progress Note        Subjective:      Post-Partum Day #1 after uncomplicated vaginal delivery.    Patient is without complaints. Lochia less than menses. Breast feeding. Pain is well controlled. Patient is ambulating. Tolerating Full Regular diet.Overall mother and baby are doing well.     Objective:      Temp:  [97.9 °F (36.6 °C)-98.6 °F (37 °C)] 98 °F (36.7 °C)  Pulse:  [] 68  Resp:  [16-18] 16  SpO2:  [75 %-100 %] 97 %  BP: ()/(50-93) 111/73    Intake/Output Summary (Last 24 hours) at 10/13/2023 0916  Last data filed at 10/12/2023 2117  Gross per 24 hour   Intake --   Output 1700 ml   Net -1700 ml     Body mass index is 27.25 kg/m².    General: no acute distress  Abdomen: soft, non-tender, non-distended; Fundus firm and at the umbilicus     Extremities: non-tender, symmetric, trace edema    Group & Rh   Date Value Ref Range Status   10/12/2023 A POS  Final     Recent Results (from the past 336 hour(s))   CBC with Differential    Collection Time: 10/12/23  4:27 AM   Result Value Ref Range    WBC 6.70 4.50 - 11.50 x10(3)/mcL    Hgb 12.6 12.0 - 16.0 g/dL    Hct 36.9 (L) 37.0 - 47.0 %    Platelet 174 130 - 400 x10(3)/mcL          Assessment:     28 y.o.  S/P  Post-Partum Day #1  - Doing Well      Plan:     1. Continue routine postpartum care  2. Plan for D/C in AM

## 2023-10-13 NOTE — LACTATION NOTE
Baby very sleepy and fussy at the breast. Mom's nipples are cracked and bleeding. Difficulty getting a good latch--several attempts made. Baby fed well for 4 consistent minutes, but approximately 8 min total. Mom is also hand expressing and syringe feeding. Educated mom on latch/position techniques. Encouraged frequent feeds on cue, discussed early hunger cues. Encouraged waking baby if needed to ensure 8 or more feeds per 24 hrs. Tips on waking sleepy baby discussed. Signs of milk transfer/adequate intake discussed. Encouraged to call with any signs indicating a problem, such as painful latch, nipple irritation, unable to sustain latch, or with any questions or needs.

## 2023-10-13 NOTE — NURSING
Pt ambulated to bathroom with assistance of the nurse, steady gait noted. Pt voided at this time. Tuck and pads applied to perineum. Pt wheeled to MBU.

## 2023-10-13 NOTE — PLAN OF CARE
Problem: Adult Inpatient Plan of Care  Goal: Plan of Care Review  Outcome: Ongoing, Progressing  Goal: Patient-Specific Goal (Individualized)  Outcome: Ongoing, Progressing  Goal: Absence of Hospital-Acquired Illness or Injury  Outcome: Ongoing, Progressing  Goal: Optimal Comfort and Wellbeing  Outcome: Ongoing, Progressing  Goal: Readiness for Transition of Care  Outcome: Ongoing, Progressing     Problem: Infection  Goal: Absence of Infection Signs and Symptoms  Outcome: Ongoing, Progressing     Problem:  Fall Injury Risk  Goal: Absence of Fall, Infant Drop and Related Injury  Outcome: Ongoing, Progressing     Problem: Bleeding (Labor)  Goal: Hemostasis  Outcome: Ongoing, Progressing     Problem: Change in Fetal Wellbeing (Labor)  Goal: Stable Fetal Wellbeing  Outcome: Ongoing, Progressing     Problem: Delayed Labor Progression (Labor)  Goal: Effective Progression to Delivery  Outcome: Ongoing, Progressing     Problem: Infection (Labor)  Goal: Absence of Infection Signs and Symptoms  Outcome: Ongoing, Progressing     Problem: Labor Pain (Labor)  Goal: Acceptable Pain Control  Outcome: Ongoing, Progressing     Problem: Uterine Tachysystole (Labor)  Goal: Normal Uterine Contraction Pattern  Outcome: Ongoing, Progressing     Problem: Adjustment to Role Transition (Postpartum Vaginal Delivery)  Goal: Successful Maternal Role Transition  Outcome: Ongoing, Progressing     Problem: Bleeding (Postpartum Vaginal Delivery)  Goal: Hemostasis  Outcome: Ongoing, Progressing     Problem: Infection (Postpartum Vaginal Delivery)  Goal: Absence of Infection Signs/Symptoms  Outcome: Ongoing, Progressing     Problem: Pain (Postpartum Vaginal Delivery)  Goal: Acceptable Pain Control  Outcome: Ongoing, Progressing     Problem: Urinary Retention (Postpartum Vaginal Delivery)  Goal: Effective Urinary Elimination  Outcome: Ongoing, Progressing     Problem: Breastfeeding  Goal: Effective Breastfeeding  Outcome: Ongoing,  Progressing

## 2023-10-13 NOTE — ANESTHESIA POSTPROCEDURE EVALUATION
Anesthesia Post Evaluation    Patient: Isa Rice    Procedure(s) Performed: * No procedures listed *    Final Anesthesia Type: epidural      Patient location during evaluation: PACU  Patient participation: Yes- Able to Participate  Level of consciousness: awake and alert and oriented  Post-procedure vital signs: reviewed and stable  Pain management: adequate  Airway patency: patent  SERVANDO mitigation strategies: Use of major conduction anesthesia (spinal/epidural) or peripheral nerve block  PONV status at discharge: No PONV  Anesthetic complications: no      Cardiovascular status: blood pressure returned to baseline and stable  Respiratory status: unassisted  Hydration status: euvolemic  Follow-up not needed.  Comments: EPIDURAL BLK RESOLVED , SENSORY MOTOR INTACT, DENIES HEADACHE.            Vitals Value Taken Time   /73 10/13/23 0759   Temp 36.7 °C (98 °F) 10/13/23 0759   Pulse 68 10/13/23 0759   Resp 16 10/13/23 0759   SpO2 97 % 10/12/23 2008         No case tracking events are documented in the log.      Pain/Misha Score: Pain Rating Prior to Med Admin: 3 (10/13/2023 10:42 AM)  Pain Rating Post Med Admin: 0 (10/13/2023  5:34 AM)

## 2023-10-13 NOTE — L&D DELIVERY NOTE
[facility] - Labor and Delivery  Vaginal Delivery   Operative Note    SUMMARY     Normal spontaneous vaginal delivery of live infant, was placed on mothers abdomen for skin to skin and bulb suctioning performed.  Infant delivered position BLU over perineum.  Nuchal cord: No.    Spontaneous delivery of placenta and IV pitocin given noting good uterine tone.  Left vaginal sidewall and 1st degree midline repaired with 2-3 chromic.  Patient tolerated delivery well. Sponge needle and lap counted correctly x2.    Indications: Vaginal delivery  Pregnancy complicated by:   Patient Active Problem List   Diagnosis    Vaginal delivery     Admitting GA: 39w0d    Delivery Information for Servando Rice    Birth information:  YOB: 2025   Time of birth: 8:47 PM   Sex: male   Head Delivery Date/Time: 10/12/2023  8:47 PM   Delivery type: Vaginal, Spontaneous   Gestational Age: 39w0d        Delivery Providers    Delivering clinician: Gunjan Bejarano MD   Provider Role    Megan Alcala RN Registered Nurse    Angelina Nair RN Registered Nurse    Daria Avendaño RN Registered Nurse              Measurements    Weight:   Length:          Apgars    Living status: Living  Apgar Component Scores:  1 min.:  5 min.:  10 min.:  15 min.:  20 min.:    Skin color:  0  1       Heart rate:  2  2       Reflex irritability:  2  2       Muscle tone:  2  2       Respiratory effort:  2  2       Total:  8  9       Apgars assigned by: ASHVIN AREVALO         Operative Delivery    Forceps attempted?: No  Vacuum extractor attempted?: No         Shoulder Dystocia    Shoulder dystocia present?: No           Presentation    Presentation: Vertex  Position: Left Occiput Anterior           Interventions/Resuscitation    Method: Bulb Suctioning, Tactile Stimulation       Cord    Vessels: 3 vessels  Complications: None  Delayed Cord Clamping?: Yes  Cord Clamped Date/Time: 10/12/2023  8:51 PM  Cord Blood Disposition: Sent with  Baby  Gases Sent?: No  Stem Cell Collection (by MD): No       Placenta    Placenta delivery date/time: 10/12/2023 2051  Placenta removal: Spontaneous  Placenta appearance: Intact  Placenta disposition: Discarded           Labor Events:       labor: No     Labor Onset Date/Time:         Dilation Complete Date/Time:         Start Pushing Date/Time:       Rupture Date/Time: 10/12/23  0820         Rupture type: ARM (Artificial Rupture)         Fluid Amount:       Fluid Color: Clear       steroids: None     Antibiotics given for GBS: No     Induction:       Indications for induction:  Elective     Augmentation: oxytocin     Indications for augmentation:       Labor complications: None     Additional complications:          Cervical ripening:                     Delivery:      Episiotomy: None     Indication for Episiotomy:       Perineal Lacerations: 1st Repaired:  Yes   Periurethral Laceration:   Repaired:     Labial Laceration:   Repaired:     Sulcus Laceration:   Repaired:     Vaginal Laceration: Yes Repaired: Yes   Cervical Laceration:   Repaired:     Repair suture:       Repair # of packets:       Last Value - EBL - Nursing (mL):       Sum - EBL - Nursing (mL): 0     Last Value - EBL - Anesthesia (mL):      Calculated QBL (mL):       Vaginal Sweep Performed: No     Surgicount Correct: Yes       Other providers:       Anesthesia    Method: Epidural          Details (if applicable):  Trial of Labor      Categorization:      Priority:     Indications for :     Incision Type:       Additional  information:  Forceps:    Vacuum:    Breech:    Observed anomalies    Other (Comments):

## 2023-10-14 VITALS
TEMPERATURE: 98 F | HEART RATE: 62 BPM | WEIGHT: 149 LBS | SYSTOLIC BLOOD PRESSURE: 109 MMHG | OXYGEN SATURATION: 97 % | BODY MASS INDEX: 27.42 KG/M2 | RESPIRATION RATE: 18 BRPM | DIASTOLIC BLOOD PRESSURE: 72 MMHG | HEIGHT: 62 IN

## 2023-10-14 PROCEDURE — 25000003 PHARM REV CODE 250: Performed by: OBSTETRICS & GYNECOLOGY

## 2023-10-14 RX ADMIN — IBUPROFEN 600 MG: 600 TABLET, FILM COATED ORAL at 08:10

## 2023-10-14 RX ADMIN — ACETAMINOPHEN 650 MG: 325 TABLET, FILM COATED ORAL at 05:10

## 2023-10-14 RX ADMIN — IBUPROFEN 600 MG: 600 TABLET, FILM COATED ORAL at 01:10

## 2023-10-14 RX ADMIN — DOCUSATE SODIUM 200 MG: 100 CAPSULE, LIQUID FILLED ORAL at 08:10

## 2023-10-14 NOTE — DISCHARGE SUMMARY
"[facility] - 2nd Floor Mother/Baby Unit  Obstetrics  Discharge Summary      Patient Name: Isa Rice  MRN: [id number]  Admission Date: 10/12/2023  Hospital Length of Stay: 2 days  Discharge Date and Time: No discharge date for patient encounter.  Attending Physician: Gunjan Bejarano MD   Discharging Provider: Fareed Styles MD   Primary Care Provider: Shelly, Primary Doctor    HPI: Admit for delivery     Hospital Course:    and PP care       Final Active Diagnoses:    Diagnosis Date Noted POA    PRINCIPAL PROBLEM:  Vaginal delivery [O80] 10/12/2023 Not Applicable      Problems Resolved During this Admission:        Significant Diagnostic Studies:       Feeding Method: breast    Immunizations     Date Immunization Status Dose Route/Site Given by    21 0000 COVID-19, MRNA, LN-S, PF (Pfizer) (Purple Cap) Given 30 ug      21 0000 COVID-19, MRNA, LN-S, PF (Pfizer) (Purple Cap) Given 30 ug      10/13/23 0052 Influenza - Quadrivalent - PF *Preferred* (6 months and older) Deleted 0.5 mL Intramuscular/     10/13/23 0051 Influenza - Quadrivalent - PF *Preferred* (6 months and older) Given 0.5 mL Intramuscular/Left arm Laya Samano RN          Delivery:    Episiotomy: None   Lacerations: 1st   Repair suture:     Repair # of packets:     Blood loss (ml):       Birth information:  YOB: 2025   Time of birth: 8:47 PM   Sex: male   Delivery type: Vaginal, Spontaneous   Gestational Age: 39w0d     Measurements    Weight: 3940 g  Weight (lbs): 8 lb 11 oz  Length: 54.6 cm  Length (in): 21.5"  Head circumference: 34.9 cm         Delivery Clinician: Delivery Providers    Delivering clinician: Gunjan Bejarano MD   Provider Role    Megan Alcala RN Registered Nurse    Angelina Nair RN Registered Nurse    Daria Avendaño RN Registered Nurse           Additional  information:  Forceps:    Vacuum:    Breech:    Observed anomalies      Living?:     Apgars    Living status: " Living  Apgar Component Scores:  1 min.:  5 min.:  10 min.:  15 min.:  20 min.:    Skin color:  0  1       Heart rate:  2  2       Reflex irritability:  2  2       Muscle tone:  2  2       Respiratory effort:  2  2       Total:  8  9       Apgars assigned by: ASHVIN AREVALO         Placenta: Delivered:       appearance    Pending Diagnostic Studies:     None          Discharged Condition: good    Disposition: Home or Self Care    Follow Up:   Follow-up Information     Gunjan Bejarano MD Follow up in 6 week(s).    Specialty: Obstetrics and Gynecology  Contact information:  [address] [place] [address]  [address]  [facility] [address]  [phone]                       Patient Instructions:      Diet Adult Regular     No dressing needed     Notify your health care provider if you experience any of the following:  temperature >100.4     Notify your health care provider if you experience any of the following:  persistent nausea and vomiting or diarrhea     Notify your health care provider if you experience any of the following:  severe uncontrolled pain     Notify your health care provider if you experience any of the following:  redness, tenderness, or signs of infection (pain, swelling, redness, odor or green/yellow discharge around incision site)     Notify your health care provider if you experience any of the following:  difficulty breathing or increased cough     Notify your health care provider if you experience any of the following:  severe persistent headache     Notify your health care provider if you experience any of the following:  worsening rash     Notify your health care provider if you experience any of the following:  persistent dizziness, light-headedness, or visual disturbances     Notify your health care provider if you experience any of the following:  increased confusion or weakness     Notify your health care provider if you experience any of the following:     Pelvic Rest     Activity as tolerated      Medications:  Current Discharge Medication List      CONTINUE these medications which have NOT CHANGED    Details   prenatal vit/iron fum/folic ac (PRENATAL 1+1 ORAL) Take by mouth.             Fareed Styles MD  Obstetrics  [facility] - 2nd Floor Mother/Baby Unit

## 2023-10-17 ENCOUNTER — PATIENT MESSAGE (OUTPATIENT)
Dept: ADMINISTRATIVE | Facility: OTHER | Age: 28
End: 2023-10-17
Payer: COMMERCIAL

## 2023-10-19 ENCOUNTER — LACTATION CONSULT (OUTPATIENT)
Dept: LACTATION | Facility: HOSPITAL | Age: 28
End: 2023-10-19
Payer: COMMERCIAL

## 2023-10-19 DIAGNOSIS — N64.4 NIPPLE PAIN: ICD-10-CM

## 2023-10-19 DIAGNOSIS — O92.79: ICD-10-CM

## 2023-10-19 PROCEDURE — 99199 UNLISTED SPECIAL SVC PX/RPRT: CPT

## 2023-10-19 NOTE — PROGRESS NOTES
Infant sleeping upon entering consult room but easily woke after diaper change and skin to skin. Comfortable latch achieved bilaterally with assistance. Frequent audible swallows and small spit up of mature milk after 12 minutes on left breast and 6 minutes on right breast. 48 mL transferred per weighed feed. Mother's nipples scabbed and tender bilaterally but improving with use of lanolin and silverettes. Encouraged mother to use expressed breast milk in silverettes instead of lanolin and wash frequently. Educated on managing encouragement and frequency and duration of pumping. Mother using wearable pump for five minutes bilaterally a few times a day as needed to manage engorgement. Using [employer] wearable pump with 17 mm inserts and reports comfortable pumping session with 0.5 oz collected total each session. Educated on proper care and cleaning of pump parts and safe milk storage. Advised mother against keeping unwashed parts in refrigerator between sessions. Recommended washing parts after each session and sanitizing once per 24 hours.

## 2023-11-02 ENCOUNTER — PATIENT MESSAGE (OUTPATIENT)
Dept: LACTATION | Facility: HOSPITAL | Age: 28
End: 2023-11-02
Payer: COMMERCIAL